# Patient Record
Sex: MALE | Race: WHITE | NOT HISPANIC OR LATINO | Employment: OTHER | URBAN - METROPOLITAN AREA
[De-identification: names, ages, dates, MRNs, and addresses within clinical notes are randomized per-mention and may not be internally consistent; named-entity substitution may affect disease eponyms.]

---

## 2017-04-19 PROBLEM — I10 ESSENTIAL HYPERTENSION: Status: ACTIVE | Noted: 2017-04-19

## 2017-06-13 PROBLEM — H35.81 MACULAR EDEMA: Status: ACTIVE | Noted: 2017-06-13

## 2017-12-13 PROBLEM — N52.9 ERECTILE DYSFUNCTION: Status: ACTIVE | Noted: 2017-12-13

## 2017-12-13 PROBLEM — K21.9 GASTROESOPHAGEAL REFLUX DISEASE: Status: ACTIVE | Noted: 2017-12-13

## 2017-12-13 PROBLEM — M17.0 OSTEOARTHRITIS OF BOTH KNEES: Status: ACTIVE | Noted: 2017-12-13

## 2018-03-27 PROBLEM — E66.9 OBESITY (BMI 30-39.9): Status: ACTIVE | Noted: 2018-03-27

## 2019-04-14 ENCOUNTER — OFFICE VISIT (OUTPATIENT)
Dept: URGENT CARE | Facility: CLINIC | Age: 69
End: 2019-04-14
Payer: MEDICARE

## 2019-04-14 DIAGNOSIS — L03.011 PARONYCHIA OF FINGER, RIGHT: Primary | ICD-10-CM

## 2019-04-14 PROCEDURE — 99214 PR OFFICE/OUTPT VISIT, EST, LEVL IV, 30-39 MIN: ICD-10-PCS | Mod: 25,S$GLB,, | Performed by: PHYSICIAN ASSISTANT

## 2019-04-14 PROCEDURE — 10060 I&D ABSCESS SIMPLE/SINGLE: CPT | Mod: S$GLB,,, | Performed by: PHYSICIAN ASSISTANT

## 2019-04-14 PROCEDURE — 99214 OFFICE O/P EST MOD 30 MIN: CPT | Mod: 25,S$GLB,, | Performed by: PHYSICIAN ASSISTANT

## 2019-04-14 PROCEDURE — 10060 PR DRAIN SKIN ABSCESS SIMPLE: ICD-10-PCS | Mod: S$GLB,,, | Performed by: PHYSICIAN ASSISTANT

## 2019-04-15 RX ORDER — SULFAMETHOXAZOLE AND TRIMETHOPRIM 800; 160 MG/1; MG/1
1 TABLET ORAL 2 TIMES DAILY
Qty: 14 TABLET | Refills: 0
Start: 2019-04-15 | End: 2019-04-22

## 2019-04-15 NOTE — PROGRESS NOTES
"Subjective:       Patient ID: Franco Russo is a 69 y.o. male.    Vitals:  height is 5' 9" (1.753 m) and weight is 100.3 kg (221 lb 1.9 oz). His oral temperature is 97.2 °F (36.2 °C). His blood pressure is 142/86 (abnormal) and his pulse is 68. His respiration is 18 and oxygen saturation is 97%.     Chief Complaint: Finger Pain    Patient complains of (R) thumb pain/swelling since last night. He states that when he woke up, it was very swollen and hot to the touch, possibly infected. He denies bumping into anything/injuring it. He is unsure what might have caused it.  He has not taken any medications for it, but states that running it under cool water makes it feel better. He states that there might be a splinter in it, but he is unsure.    Finger Pain   This is a new problem. The current episode started yesterday. The problem occurs constantly. The problem has been rapidly worsening. Pertinent negatives include no arthralgias, chest pain, chills, congestion, coughing, fatigue, fever, headaches, joint swelling, myalgias, nausea, rash, sore throat, vertigo or vomiting. Nothing aggravates the symptoms. He has tried nothing for the symptoms.       Constitution: Negative for chills, fatigue and fever.   HENT: Negative for congestion and sore throat.    Neck: Negative for painful lymph nodes.   Cardiovascular: Negative for chest pain and leg swelling.   Eyes: Negative for double vision and blurred vision.   Respiratory: Negative for cough and shortness of breath.    Gastrointestinal: Negative for nausea, vomiting and diarrhea.   Genitourinary: Negative for dysuria, frequency and urgency.   Musculoskeletal: Negative for joint pain, joint swelling, muscle cramps and muscle ache.   Skin: Positive for color change. Negative for pale, rash and erythema.   Allergic/Immunologic: Negative for seasonal allergies.   Neurological: Negative for dizziness, history of vertigo, light-headedness, passing out and headaches. "   Hematologic/Lymphatic: Negative for swollen lymph nodes, easy bruising/bleeding and history of blood clots. Does not bruise/bleed easily.   Psychiatric/Behavioral: Negative for nervous/anxious, sleep disturbance and depression. The patient is not nervous/anxious.        Objective:      Physical Exam   Constitutional: He is oriented to person, place, and time. He appears well-developed and well-nourished.   HENT:   Head: Normocephalic and atraumatic. Head is without abrasion, without contusion and without laceration.   Right Ear: External ear normal.   Left Ear: External ear normal.   Nose: Nose normal.   Mouth/Throat: Oropharynx is clear and moist.   Eyes: Pupils are equal, round, and reactive to light. Conjunctivae, EOM and lids are normal.   Neck: Trachea normal, full passive range of motion without pain and phonation normal. Neck supple.   Cardiovascular: Normal rate, regular rhythm and normal heart sounds.   Pulmonary/Chest: Effort normal and breath sounds normal. No stridor. No respiratory distress.   Musculoskeletal: Normal range of motion.   Neurological: He is alert and oriented to person, place, and time.   Skin: Skin is warm, dry and intact. Capillary refill takes less than 2 seconds. No abrasion, no bruising, no burn, no ecchymosis, no laceration, no lesion and no rash noted. No erythema.   Paronychia right thumb, surrounding erythema.  No tenderness to palpation of the joint.   Psychiatric: He has a normal mood and affect. His speech is normal and behavior is normal. Judgment and thought content normal. Cognition and memory are normal.   Nursing note and vitals reviewed.      Assessment:       1. Paronychia of finger, right        Plan:         Paronychia of finger, right    Other orders  -     sulfamethoxazole-trimethoprim 800-160mg (BACTRIM DS) 800-160 mg Tab; Take 1 tablet by mouth 2 (two) times daily. for 7 days  Dispense: 14 tablet; Refill: 0    There is an paronychia to the right thumb nail bed.     No anesthesia use.  Opened with 18 gauge needle.  Purulent material expressed.  Dressing applied.  Patient tolerated the procedure well.      Instructed probiotic with antibiotic.  Instructed warm soaks tonight.  If any increasing erythema or pain, return to clinic or go to ED.  Patient states understanding and agrees with the plan.    You must understand that you've received an Urgent Care treatment only and that you may be released before all your medical problems are known or treated. You, the patient, will arrange for follow up care as instructed.  Follow up with your PCP or specialty clinic as directed in the next 1-2 weeks if not improved or as needed.  You can call [phone] to schedule an appointment with the appropriate provider.  If your condition worsens we recommend that you receive another evaluation at the emergency room immediately or contact your primary medical clinics after hours call service to discuss your concerns.  Please return here or go to the Emergency Department for any concerns or worsening of condition.

## 2019-04-16 VITALS
DIASTOLIC BLOOD PRESSURE: 86 MMHG | WEIGHT: 221.13 LBS | TEMPERATURE: 97 F | BODY MASS INDEX: 32.75 KG/M2 | HEART RATE: 68 BPM | RESPIRATION RATE: 18 BRPM | OXYGEN SATURATION: 97 % | HEIGHT: 69 IN | SYSTOLIC BLOOD PRESSURE: 142 MMHG

## 2019-04-17 ENCOUNTER — TELEPHONE (OUTPATIENT)
Dept: URGENT CARE | Facility: CLINIC | Age: 69
End: 2019-04-17

## 2020-02-24 PROBLEM — I48.0 AF (PAROXYSMAL ATRIAL FIBRILLATION): Status: ACTIVE | Noted: 2020-02-24

## 2020-10-02 ENCOUNTER — IMMUNIZATION (OUTPATIENT)
Dept: FAMILY MEDICINE | Facility: CLINIC | Age: 70
End: 2020-10-02
Payer: MEDICARE

## 2020-10-02 PROCEDURE — 90694 VACC AIIV4 NO PRSRV 0.5ML IM: CPT | Mod: PBBFAC,PO

## 2020-10-02 PROCEDURE — G0008 ADMIN INFLUENZA VIRUS VAC: HCPCS | Mod: PBBFAC,PO

## 2020-12-21 ENCOUNTER — PATIENT MESSAGE (OUTPATIENT)
Dept: OTHER | Facility: OTHER | Age: 70
End: 2020-12-21

## 2022-09-30 PROBLEM — R00.1 BRADYCARDIA: Status: ACTIVE | Noted: 2022-09-30

## 2022-10-14 ENCOUNTER — IMMUNIZATION (OUTPATIENT)
Dept: FAMILY MEDICINE | Facility: CLINIC | Age: 72
End: 2022-10-14
Payer: MEDICARE

## 2022-10-14 PROCEDURE — G0008 ADMIN INFLUENZA VIRUS VAC: HCPCS | Mod: PBBFAC,PO

## 2023-06-12 PROBLEM — I70.0 AORTIC ATHEROSCLEROSIS: Status: ACTIVE | Noted: 2023-06-12

## 2023-07-03 ENCOUNTER — TELEPHONE (OUTPATIENT)
Dept: FAMILY MEDICINE | Facility: CLINIC | Age: 73
End: 2023-07-03
Payer: MEDICARE

## 2023-07-20 ENCOUNTER — OFFICE VISIT (OUTPATIENT)
Dept: PAIN MEDICINE | Facility: CLINIC | Age: 73
End: 2023-07-20
Payer: MEDICARE

## 2023-07-20 VITALS
DIASTOLIC BLOOD PRESSURE: 63 MMHG | BODY MASS INDEX: 33.06 KG/M2 | HEIGHT: 69 IN | WEIGHT: 223.19 LBS | HEART RATE: 75 BPM | SYSTOLIC BLOOD PRESSURE: 106 MMHG

## 2023-07-20 DIAGNOSIS — M54.50 LOW BACK PAIN, UNSPECIFIED BACK PAIN LATERALITY, UNSPECIFIED CHRONICITY, UNSPECIFIED WHETHER SCIATICA PRESENT: ICD-10-CM

## 2023-07-20 DIAGNOSIS — M54.50 LUMBAR BACK PAIN: Primary | ICD-10-CM

## 2023-07-20 DIAGNOSIS — G89.29 CHRONIC MIDLINE THORACIC BACK PAIN: ICD-10-CM

## 2023-07-20 DIAGNOSIS — M54.6 CHRONIC MIDLINE THORACIC BACK PAIN: ICD-10-CM

## 2023-07-20 PROCEDURE — 99999 PR PBB SHADOW E&M-EST. PATIENT-LVL IV: CPT | Mod: PBBFAC,,, | Performed by: PHYSICIAN ASSISTANT

## 2023-07-20 PROCEDURE — 99999 PR PBB SHADOW E&M-EST. PATIENT-LVL IV: ICD-10-PCS | Mod: PBBFAC,,, | Performed by: PHYSICIAN ASSISTANT

## 2023-07-20 PROCEDURE — 99214 OFFICE O/P EST MOD 30 MIN: CPT | Mod: PBBFAC,PN | Performed by: PHYSICIAN ASSISTANT

## 2023-07-20 PROCEDURE — 99204 PR OFFICE/OUTPT VISIT, NEW, LEVL IV, 45-59 MIN: ICD-10-PCS | Mod: S$PBB,,, | Performed by: PHYSICIAN ASSISTANT

## 2023-07-20 PROCEDURE — 99204 OFFICE O/P NEW MOD 45 MIN: CPT | Mod: S$PBB,,, | Performed by: PHYSICIAN ASSISTANT

## 2023-07-20 RX ORDER — METHOCARBAMOL 500 MG/1
500 TABLET, FILM COATED ORAL 4 TIMES DAILY
Qty: 40 TABLET | Refills: 0 | Status: SHIPPED | OUTPATIENT
Start: 2023-07-20 | End: 2023-07-20

## 2023-07-20 RX ORDER — METHOCARBAMOL 500 MG/1
500 TABLET, FILM COATED ORAL NIGHTLY PRN
Qty: 40 TABLET | Refills: 0 | Status: SHIPPED | OUTPATIENT
Start: 2023-07-20
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
## 2024-02-20 PROBLEM — R93.1 ELEVATED CORONARY ARTERY CALCIUM SCORE: Status: ACTIVE | Noted: 2024-02-20

## 2024-02-20 PROBLEM — I48.20 CHRONIC ATRIAL FIBRILLATION: Status: RESOLVED | Noted: 2024-02-20 | Resolved: 2024-02-20

## 2024-02-20 PROBLEM — I48.20 CHRONIC ATRIAL FIBRILLATION: Status: ACTIVE | Noted: 2024-02-20

## 2024-07-10 ENCOUNTER — OFFICE VISIT (OUTPATIENT)
Dept: PAIN MEDICINE | Facility: CLINIC | Age: 74
End: 2024-07-10
Payer: MEDICARE

## 2024-07-10 VITALS
SYSTOLIC BLOOD PRESSURE: 125 MMHG | HEIGHT: 69 IN | WEIGHT: 212.31 LBS | DIASTOLIC BLOOD PRESSURE: 73 MMHG | BODY MASS INDEX: 31.44 KG/M2 | HEART RATE: 92 BPM

## 2024-07-10 DIAGNOSIS — M54.50 LOW BACK PAIN, UNSPECIFIED BACK PAIN LATERALITY, UNSPECIFIED CHRONICITY, UNSPECIFIED WHETHER SCIATICA PRESENT: ICD-10-CM

## 2024-07-10 DIAGNOSIS — M54.6 CHRONIC MIDLINE THORACIC BACK PAIN: ICD-10-CM

## 2024-07-10 DIAGNOSIS — M54.50 LUMBAR BACK PAIN: ICD-10-CM

## 2024-07-10 DIAGNOSIS — G89.29 CHRONIC MIDLINE THORACIC BACK PAIN: ICD-10-CM

## 2024-07-10 PROCEDURE — 99999 PR PBB SHADOW E&M-EST. PATIENT-LVL IV: CPT | Mod: PBBFAC,,, | Performed by: PHYSICIAN ASSISTANT

## 2024-07-10 PROCEDURE — 99214 OFFICE O/P EST MOD 30 MIN: CPT | Mod: PBBFAC,PO | Performed by: PHYSICIAN ASSISTANT

## 2024-07-10 PROCEDURE — 99213 OFFICE O/P EST LOW 20 MIN: CPT | Mod: S$PBB,,, | Performed by: PHYSICIAN ASSISTANT

## 2024-07-10 RX ORDER — METHOCARBAMOL 500 MG/1
500 TABLET, FILM COATED ORAL NIGHTLY PRN
Qty: 40 TABLET | Refills: 0 | Status: SHIPPED | OUTPATIENT
Start: 2024-07-10
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
## 2024-08-31 PROBLEM — R55 POSTURAL DIZZINESS WITH PRESYNCOPE: Status: ACTIVE | Noted: 2024-08-31

## 2024-08-31 PROBLEM — R42 POSTURAL DIZZINESS WITH PRESYNCOPE: Status: ACTIVE | Noted: 2024-08-31

## 2024-08-31 PROBLEM — R07.9 CHEST PAIN: Status: ACTIVE | Noted: 2024-08-31

## 2024-09-01 PROBLEM — R07.89 ATYPICAL CHEST PAIN: Status: ACTIVE | Noted: 2024-08-31

## 2024-09-19 ENCOUNTER — PATIENT MESSAGE (OUTPATIENT)
Dept: PSYCHIATRY | Facility: CLINIC | Age: 74
End: 2024-09-19
Payer: MEDICARE

## 2024-09-26 ENCOUNTER — PATIENT MESSAGE (OUTPATIENT)
Dept: PSYCHIATRY | Facility: CLINIC | Age: 74
End: 2024-09-26
Payer: MEDICARE

## 2024-10-10 ENCOUNTER — OFFICE VISIT (OUTPATIENT)
Dept: PSYCHIATRY | Facility: CLINIC | Age: 74
End: 2024-10-10
Payer: MEDICARE

## 2024-10-10 VITALS
HEIGHT: 71 IN | BODY MASS INDEX: 26.62 KG/M2 | WEIGHT: 190.13 LBS | DIASTOLIC BLOOD PRESSURE: 74 MMHG | HEART RATE: 68 BPM | SYSTOLIC BLOOD PRESSURE: 113 MMHG

## 2024-10-10 DIAGNOSIS — F43.21 SITUATIONAL DEPRESSION: ICD-10-CM

## 2024-10-10 DIAGNOSIS — G47.00 INSOMNIA, UNSPECIFIED TYPE: ICD-10-CM

## 2024-10-10 DIAGNOSIS — F41.1 GAD (GENERALIZED ANXIETY DISORDER): Primary | ICD-10-CM

## 2024-10-10 PROCEDURE — 90792 PSYCH DIAG EVAL W/MED SRVCS: CPT | Mod: ,,,

## 2024-10-10 PROCEDURE — 99215 OFFICE O/P EST HI 40 MIN: CPT | Mod: PBBFAC,PO

## 2024-10-10 PROCEDURE — 99999 PR PBB SHADOW E&M-EST. PATIENT-LVL V: CPT | Mod: PBBFAC,,,

## 2024-10-10 RX ORDER — SERTRALINE HYDROCHLORIDE 50 MG/1
50 TABLET, FILM COATED ORAL DAILY
Qty: 30 TABLET | Refills: 1 | Status: SHIPPED | OUTPATIENT
Start: 2024-10-10 | End: 2024-10-16 | Stop reason: SINTOL
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
## 2024-10-15 ENCOUNTER — TELEPHONE (OUTPATIENT)
Dept: NEUROLOGY | Facility: CLINIC | Age: 74
End: 2024-10-15
Payer: MEDICARE

## 2024-10-15 NOTE — TELEPHONE ENCOUNTER
----- Message from Penny sent at 10/15/2024  3:18 PM CDT -----  Regarding: Same Day Appointment Request  Contact: Zee, daughter at [phone]  Type:  Same Day Appointment Request    Name of Caller:  Zee, daughter at [phone]    When is the first available appointment?  None    Symptoms:  right side numb, 1 day hospital follow up ([facility])    Additional Information:   Please call and advise. Thank you

## 2024-10-18 ENCOUNTER — OFFICE VISIT (OUTPATIENT)
Dept: PSYCHIATRY | Facility: CLINIC | Age: 74
End: 2024-10-18
Payer: MEDICARE

## 2024-10-18 DIAGNOSIS — F32.0 CURRENT MILD EPISODE OF MAJOR DEPRESSIVE DISORDER, UNSPECIFIED WHETHER RECURRENT: ICD-10-CM

## 2024-10-18 DIAGNOSIS — F41.1 GAD (GENERALIZED ANXIETY DISORDER): Primary | ICD-10-CM

## 2024-10-18 DIAGNOSIS — R45.89 ANXIETY ABOUT HEALTH: ICD-10-CM

## 2024-10-18 PROCEDURE — 99213 OFFICE O/P EST LOW 20 MIN: CPT | Mod: PBBFAC,PO

## 2024-10-18 PROCEDURE — 99999 PR PBB SHADOW E&M-EST. PATIENT-LVL III: CPT | Mod: PBBFAC,,,

## 2024-10-18 RX ORDER — ALPRAZOLAM 0.25 MG/1
0.25 TABLET ORAL DAILY PRN
Qty: 15 TABLET | Refills: 0 | Status: SHIPPED | OUTPATIENT
Start: 2024-10-18 | End: 2024-11-17

## 2024-10-18 RX ORDER — MIRTAZAPINE 15 MG/1
15 TABLET, FILM COATED ORAL NIGHTLY
Qty: 30 TABLET | Refills: 0 | Status: SHIPPED | OUTPATIENT
Start: 2024-10-18 | End: 2024-11-17
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
## 2024-10-21 ENCOUNTER — PATIENT MESSAGE (OUTPATIENT)
Dept: PSYCHIATRY | Facility: CLINIC | Age: 74
End: 2024-10-21
Payer: MEDICARE

## 2024-10-24 ENCOUNTER — OFFICE VISIT (OUTPATIENT)
Dept: NEUROLOGY | Facility: CLINIC | Age: 74
End: 2024-10-24
Payer: MEDICARE

## 2024-10-24 VITALS
BODY MASS INDEX: 26.37 KG/M2 | DIASTOLIC BLOOD PRESSURE: 71 MMHG | HEIGHT: 70 IN | HEART RATE: 55 BPM | WEIGHT: 184.19 LBS | SYSTOLIC BLOOD PRESSURE: 100 MMHG | RESPIRATION RATE: 15 BRPM

## 2024-10-24 DIAGNOSIS — R20.0 RIGHT SIDED NUMBNESS: ICD-10-CM

## 2024-10-24 DIAGNOSIS — G56.21 ENTRAPMENT OF RIGHT ULNAR NERVE: Primary | ICD-10-CM

## 2024-10-24 DIAGNOSIS — R20.0 RIGHT LEG NUMBNESS: ICD-10-CM

## 2024-10-24 PROCEDURE — 99999 PR PBB SHADOW E&M-EST. PATIENT-LVL V: CPT | Mod: PBBFAC,,, | Performed by: NURSE PRACTITIONER

## 2024-10-24 PROCEDURE — 99215 OFFICE O/P EST HI 40 MIN: CPT | Mod: PBBFAC,PO | Performed by: NURSE PRACTITIONER

## 2024-10-24 PROCEDURE — 99204 OFFICE O/P NEW MOD 45 MIN: CPT | Mod: S$PBB,,, | Performed by: NURSE PRACTITIONER

## 2024-10-24 NOTE — ASSESSMENT & PLAN NOTE
Reports of right arm numbness when resting his elbow on an object   - very mild pain and numbness  Overall improved.   Neuro exam without sensory or strength deficit  EMG/NCS previously ordered but symptoms began to improve so testing was canceled  Referral placed to PT for eval and suggestion on splinting.   If symptoms worsen consider referral to specialist   Conservative measures discussed
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
## 2024-10-24 NOTE — PROGRESS NOTES
"NEUROLOGY  Outpatient Consultation Visit     [facility]  [address] [facility] [place] [address]  [phone] (office) / [phone] (fax)    Patient Name:  Franco Russo  :  1950  MR #:  [id number]  Acct #:  [id number]    Date of Neurology Consult: 10/24/2024  Name of Provider: KEENA Clark    Other Physicians:  Chato Felix MD (Primary Care Physician); Self, Aaareferral (Referring)      Chief Complaint: Numbness      History of Present Illness (HPI):  Franco Russo is a right handed 74 y.o. male with a PMHX of VIDA, Afib       Patient presents today for right side numbness.   He had a right total knee replacement in 2024 and recovered OK. He was not getting much ROM with the right knee and it was decided he needed another surgery. In August he did dhave another surgery to clear out scar tissue. He began having numbness to his right leg in late August and presented to the ED . He was also having chest discomfort at this time and cardiac workup was completed. He states the numbness to his right knee eventually resolved.   He presented to the ED ~ 9 days ago for evaluation for reoccurane numbness to the right leg and new numbness to the right arm.  NIH 0. Workup was unrevealing. As per EMR his Sertraline dose was increased to 50 mg on 10/10/2024 by psychiatry. He has since been taken off this medication.     Since his 1st surgery he was having numbness to his right arm. He did see a MD about it and a EMG/NCS study was ordered but he didn't have it done as symptoms began to improve. Currently he is not having numbness to his arm but notices it more so when his elbow/arm is resting on an object.     ED report 10/15/2024  "74-year-old male with past medical history of hypertension, hypertriglyceridemia, CAD, paroxysmal AFib (anticoagulated on Eliquis), anxiety, GERD presents emergency department for evaluation of paresthesia. Patient initially reported that " "while lying in bed around 135 he developed right-sided numbness along with weakness to both arm and leg. Patient denies any recent trauma/injury. Patient denies any fever, headache, blurred vision, difficulty speaking, chest pain, shortness of breath, abdominal pain, urinary symptoms."                Past Medical, Surgical, Family & Social History:   Past Medical History:   Diagnosis Date    Abnormal liver enzymes     Adenomatous colon polyp     Atrial fibrillation     Benign enlargement of prostate     Branch retinal vein occlusion     bilateral     Cataracts, bilateral     Decreased libido     Heel pain     History of chicken pox     Hypertriglyceridemia     Knee pain     VIDA (obstructive sleep apnea)     USES CPAP    Pinched nerve     pt has had pain in neck into shoulder, has been seen at [facility]     Polyuria     Psoriasis     Thyroid nodule      Past Surgical History:   Procedure Laterality Date    CATARACT EXTRACTION, BILATERAL      COLONOSCOPY  02/23/2017    Dr. Valencia    COLONOSCOPY N/A 06/20/2022    FINGER SURGERY      right pinky finger     HERNIA REPAIR      umbilical    JOINT REPLACEMENT Right 03/14/2024    ; [facility]    KNEE SURGERY Right      Family History   Problem Relation Name Age of Onset    Arthritis Mother      Hypertension Mother      Heart disease Mother      Cancer Father      Hypertension Sister      Hypertension Brother       Alcohol use:  reports that he does not currently use alcohol after a past usage of about 2.0 standard drinks of alcohol per week.   (Of note, 0.6 oz = 1 beer or 6 oz = 10 beers).  Tobacco use:  reports that he has never smoked. He has never used smokeless tobacco.  Street drug use:  reports no history of drug use.  Allergies: Prozac [fluoxetine] and Zoloft [sertraline].    Home Medications:     Current Outpatient Medications:     ALPRAZolam (XANAX) 0.25 MG tablet, Take 1 tablet (0.25 mg total) by mouth daily as needed for Anxiety., Disp: 15 " tablet, Rfl: 0    ELIQUIS 5 mg Tab, TAKE 1 TABLET BY MOUTH TWICE DAILY, Disp: 60 tablet, Rfl: 6    hydroCHLOROthiazide (HYDRODIURIL) 12.5 MG Tab, TAKE 1 TABLET BY MOUTH EVERY MORNING FOR FLUID RETENTION, Disp: 90 tablet, Rfl: 1    losartan (COZAAR) 100 MG tablet, TAKE 1 TABLET BY MOUTH ONCE EVERY DAY FOR BLOOD PRESSURE, Disp: 90 tablet, Rfl: 3    MULTIVIT &MINERALS/FERROUS FUM (MULTI VITAMIN ORAL), Take 1 tablet by mouth once daily., Disp: , Rfl:     omega-3 fatty acids/fish oil (FISH OIL-OMEGA-3 FATTY ACIDS) 300-1,000 mg capsule, Take 1 capsule by mouth once daily., Disp: , Rfl:     polyethylene glycol (GLYCOLAX) 17 gram/dose powder, Take 17 g by mouth once daily., Disp: , Rfl:     rosuvastatin (CRESTOR) 5 MG tablet, Take 1 tablet (5 mg total) by mouth every evening., Disp: 90 tablet, Rfl: 3    sotaloL (BETAPACE) 80 MG tablet, Take 0.5 tablets (40 mg total) by mouth 2 (two) times daily., Disp: 30 tablet, Rfl: 1    tamsulosin (FLOMAX) 0.4 mg Cap, Take 1 capsule (0.4 mg total) by mouth nightly. TAKE 1 CAPSULE BY MOUTH ONCE EVERY DAY (PROSTATE), Disp: 90 capsule, Rfl: 3    VIT C/VIT E/LUTEIN/MIN/OMEGA-3 (OCUVITE ORAL), Take 1 capsule by mouth once daily., Disp: , Rfl:     finasteride (PROSCAR) 5 mg tablet, TAKE 1 TABLET BY MOUTH ONCE EVERY DAY FOR PROSTATE (Patient not taking: Reported on 10/24/2024), Disp: 90 tablet, Rfl: 1    HYDROcodone-acetaminophen (NORCO)  mg per tablet, Take 1 tablet by mouth 4 (four) times daily as needed for Pain. (Patient not taking: Reported on 10/24/2024), Disp: , Rfl:     levocetirizine (XYZAL) 5 MG tablet, Take 5 mg by mouth every evening. (Patient not taking: Reported on 10/24/2024), Disp: , Rfl:     mirtazapine (REMERON) 15 MG tablet, Take 1 tablet (15 mg total) by mouth every evening. (Patient not taking: Reported on 10/24/2024), Disp: 30 tablet, Rfl: 0    omeprazole-sodium bicarbonate 20-1.1 mg-gram Cap, Take 1 tablet by mouth once daily. (Patient not taking: Reported on  "10/24/2024), Disp: 30 each, Rfl: 11    sildenafiL (VIAGRA) 50 MG tablet, TAKE 1 TABLET BY MOUTH DAILY AS NEEDED FOR ERECTILE DYSFUNCTION (TAKE 30 MINUTES TO 4 HOURS BEFORE INTERCOURSE) (Patient not taking: Reported on 10/24/2024), Disp: 2 tablet, Rfl: 11    Physical Examination:  /71 (BP Location: Left arm, Patient Position: Sitting)   Pulse (!) 55   Resp 15   Ht 5' 10" (1.778 m)   Wt 83.6 kg (184 lb 3.1 oz)   BMI 26.43 kg/m²     GENERAL:  General appearance: Well, non-toxic appearing.  No apparent distress.  Neck: supple.    MENTAL STATUS:  Alertness, attention span & concentration: normal.  Language: normal.  Orientation to self, place & time:  normal.  Memory, recent & remote: normal.  Fund of knowledge: normal.      SPEECH:  Clear and fluent.  Follows complex commands.      CRANIAL NERVES:  Cranial Nerves II-XII were examined.  II - Visual fields: normal.  III, IV, VI: PERRL, EOMI, No ptosis, No nystagmus.  V - Facial sensation: normal.  VII - Face symmetry & mobility: normal.  VIII - Hearing: normal  IX, X - Palate: mobile & midline.  XI - Shoulder shrug: normal.  XII - Tongue protrusion: normal.        GROSS MOTOR:  Gait & station: antalgic   Tone: normal.  Abnormal movements: none.  Finger-nose: normal.  Rapid alternating movements: normal.  Pronator drift: normal      MUSCLE STRENGTH:       RIGHT    LEFT   5 Deltoids 5   5 Biceps 5   5 Triceps 5   5 Forearm.Pr. 5        5 Iliopsoas flex    5   5 Hip Abduct 5   5 Hip Adduct 5   5 Quads 5   5 Hams 5   5 Dorsiflex 5   5 Plantar Flex 5         REFLEXES:    RIGHT Reflex   LEFT   2+ Biceps 2+   2+ Brachiorad. 2+        - Patellar 2+         SENSORY:  Light touch: Normal throughout.   Sharp touch: Normal throughout.  Vibration: Normal throughout.   Temperature: Normal throughout.   Joint Position: Normal throughout.      Diagnostic Data Reviewed:   I have personally reviewed provider notes, labs and imaging made available to me today.     Imaging:    CT " Head Without Contrast 10/15/2024    Narrative  EXAMINATION:  CT HEAD WITHOUT CONTRAST    CLINICAL HISTORY:  Transient ischemic attack (TIA).    TECHNIQUE:  Low dose axial images were obtained through the head.  Coronal and sagittal reformations were also performed. Contrast was not administered.  Dose reduction techniques including automatic exposure control (AEC) were utilized.    Dose (DLP): 538 mGycm    COMPARISON:  CT head without contrast, 08/31/2024.    FINDINGS:  INTRACRANIAL: Stable global volume loss.  Gray-white differentiation is preserved.  No acute intracranial hemorrhage.  No hydrocephalus.  No intracranial mass effect.    SINUSES: Visualized paranasal sinuses and mastoids are clear.    SKULL/SCALP: Visualized osseous structures are normal.    ORBITS: Visualized orbits are normal.    Impression  Nighthawk concordant.  No acute intracranial abnormality.      Cardiac:  US LE 10/2024:  Impression:     Nighthawk concordant.  No evidence of deep venous thrombosis in the right lower extremity.    TTE 9/2024:    Left Ventricle: The left ventricle is normal in size. Normal wall thickness. There is normal systolic function. Ejection fraction by visual approximation is 55%. There is diastolic dysfunction but grade cannot be determined.    Right Ventricle: Normal right ventricular cavity size. Wall thickness is normal. Systolic function is normal.    Left Atrium: Left atrium is dilated. The left atrium volume index MOD is 44.4 mL/m2.    Aortic Valve: The aortic valve is a trileaflet valve.    Pulmonary Artery: The estimated pulmonary artery systolic pressure is 18 mmHg.    IVC/SVC: Normal venous pressure at 3 mmHg.    Labs:  Lab Results   Component Value Date    WBC 5.45 10/15/2024    HGB 13.9 (L) 10/15/2024    HCT 40.4 10/15/2024     10/15/2024    MCV 88 10/15/2024    RDW 12.9 10/15/2024     Lab Results   Component Value Date     10/15/2024    K 3.7 10/15/2024     10/15/2024    CO2 26  "10/15/2024    BUN 15 10/15/2024    CREATININE 0.73 10/15/2024     10/15/2024    CALCIUM 9.6 10/15/2024    MG 2.0 10/15/2024     Lab Results   Component Value Date    PROT 7.2 10/15/2024    ALBUMIN 4.4 10/15/2024    BILITOT 1.4 (H) 10/15/2024    AST 22 10/15/2024    ALKPHOS 58 10/15/2024    ALT 19 10/15/2024     No results found for: "INR", "PROTIME", "PTT"  Lab Results   Component Value Date    CHOL 131 09/01/2024    HDL 34 (L) 09/01/2024    LDLCALC 63.4 09/01/2024    TRIG 168 (H) 09/01/2024    CHOLHDL 26.0 09/01/2024     Lab Results   Component Value Date    HGBA1C 5.7 (H) 08/31/2024      No results found for: "CGWRMRCR01"  No results found for: "FOLATE"  Lab Results   Component Value Date    TSH 1.650 04/24/2024     No results found for: "VITAMINB1"  No results found for: "RPR"  No results found for: "YEN"  No components found for: "HEPATITISCANTIBODY"  No components found for: "HIV 1/2 AG/AB"  Lab Results   Component Value Date    CRP <0.50 06/26/2024     No components found for: "SEDIMENTATIONRATE"      Assessment and Plan:  Franco Russo is a 74 y.o. male.    Problem List Items Addressed This Visit          Neuro    Right leg numbness    Current Assessment & Plan     Numbness from right knee down to ankle that began after his second TK revision in August.   Use of CPM exacerbated symptoms.   Presented to ED with worsening numbness as well as RUE numbness in September   - neuro w/u unrevealing; NIH 0  Currently asymptomatic   Etiology likely related to recent TKR           Entrapment of right ulnar nerve - Primary    Current Assessment & Plan     Reports of right arm numbness when resting his elbow on an object   - very mild pain and numbness  Overall improved.   Neuro exam without sensory or strength deficit  EMG/NCS previously ordered but symptoms began to improve so testing was canceled  Referral placed to PT for eval and suggestion on splinting.   If symptoms worsen consider referral to specialist "   Conservative measures discussed                     Important to note, also  has a past medical history of Abnormal liver enzymes, Adenomatous colon polyp, Atrial fibrillation, Benign enlargement of prostate, Branch retinal vein occlusion, Cataracts, bilateral, Decreased libido, Heel pain, History of chicken pox, Hypertriglyceridemia, Knee pain, VIDA (obstructive sleep apnea), Pinched nerve, Polyuria, Psoriasis, and Thyroid nodule.            The patient will return to clinic as needed         All questions were answered and patient is comfortable with the plan.       Thank you very much for the opportunity to assist in this patient's care.    If you have any questions or concerns, please do not hesitate to contact me at any time.    Sincerely,     KEENA Clark  [facility] [place]         I spent a total of 48 minutes on the day of the visit.This includes face to face time and non-face to face time preparing to see the patient (eg, review of tests), Obtaining and/or reviewing separately obtained history, Documenting clinical information in the electronic or other health record, Independently interpreting resultsand communicating results to the patient/family/caregiver, or Care coordination.

## 2024-11-01 ENCOUNTER — OFFICE VISIT (OUTPATIENT)
Dept: PSYCHIATRY | Facility: CLINIC | Age: 74
End: 2024-11-01
Payer: MEDICARE

## 2024-11-01 DIAGNOSIS — F32.0 CURRENT MILD EPISODE OF MAJOR DEPRESSIVE DISORDER, UNSPECIFIED WHETHER RECURRENT: ICD-10-CM

## 2024-11-01 DIAGNOSIS — F41.1 GAD (GENERALIZED ANXIETY DISORDER): Primary | ICD-10-CM

## 2024-11-01 DIAGNOSIS — G47.00 INSOMNIA, UNSPECIFIED TYPE: ICD-10-CM

## 2024-11-01 PROCEDURE — G2211 COMPLEX E/M VISIT ADD ON: HCPCS | Mod: S$PBB,,,

## 2024-11-01 PROCEDURE — 99212 OFFICE O/P EST SF 10 MIN: CPT | Mod: PBBFAC,PO

## 2024-11-01 PROCEDURE — 99214 OFFICE O/P EST MOD 30 MIN: CPT | Mod: S$PBB,,,

## 2024-11-01 PROCEDURE — 99999 PR PBB SHADOW E&M-EST. PATIENT-LVL II: CPT | Mod: PBBFAC,,,

## 2024-11-01 RX ORDER — MIRTAZAPINE 30 MG/1
30 TABLET, FILM COATED ORAL NIGHTLY
Qty: 30 TABLET | Refills: 0 | Status: SHIPPED | OUTPATIENT
Start: 2024-11-01 | End: 2024-12-01
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
## 2024-11-14 ENCOUNTER — OFFICE VISIT (OUTPATIENT)
Dept: PSYCHIATRY | Facility: CLINIC | Age: 74
End: 2024-11-14
Payer: MEDICARE

## 2024-11-14 DIAGNOSIS — G47.00 INSOMNIA, UNSPECIFIED TYPE: ICD-10-CM

## 2024-11-14 DIAGNOSIS — F32.0 CURRENT MILD EPISODE OF MAJOR DEPRESSIVE DISORDER, UNSPECIFIED WHETHER RECURRENT: ICD-10-CM

## 2024-11-14 DIAGNOSIS — F41.1 GAD (GENERALIZED ANXIETY DISORDER): Primary | ICD-10-CM

## 2024-11-14 PROCEDURE — 90833 PSYTX W PT W E/M 30 MIN: CPT | Mod: ,,,

## 2024-11-14 PROCEDURE — 99213 OFFICE O/P EST LOW 20 MIN: CPT | Mod: PBBFAC,PO

## 2024-11-14 PROCEDURE — 99999 PR PBB SHADOW E&M-EST. PATIENT-LVL III: CPT | Mod: PBBFAC,,,

## 2024-11-14 PROCEDURE — 99214 OFFICE O/P EST MOD 30 MIN: CPT | Mod: S$PBB,,,

## 2024-11-14 RX ORDER — DULOXETIN HYDROCHLORIDE 20 MG/1
CAPSULE, DELAYED RELEASE ORAL
Qty: 46 CAPSULE | Refills: 0 | Status: SHIPPED | OUTPATIENT
Start: 2024-11-14 | End: 2024-12-14

## 2024-11-14 RX ORDER — HYDROXYZINE HYDROCHLORIDE 25 MG/1
25 TABLET, FILM COATED ORAL 3 TIMES DAILY PRN
Qty: 90 TABLET | Refills: 0 | Status: SHIPPED | OUTPATIENT
Start: 2024-11-14 | End: 2024-12-14

## 2024-11-14 RX ORDER — MIRTAZAPINE 7.5 MG/1
7.5 TABLET, FILM COATED ORAL NIGHTLY
Qty: 7 TABLET | Refills: 0 | Status: SHIPPED | OUTPATIENT
Start: 2024-11-14 | End: 2024-11-21
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
## 2024-11-14 NOTE — PATIENT INSTRUCTIONS
Mirtazapine -  decrease from 30 mg to 15 mg at night for 7 days   Then decrease to 7.5 mg at night for 7 days  Then stop    Duloxetine (Cymbalta)  Take 20 mg daily (at night or in the morning, which ever is more convenient) for 14 days  Then increase to 20mg (2 capsules daily)    Hydroxyzine -   Take 1 tablet as needed for severe anxiety, do not take more than 3 tablets per day

## 2024-11-14 NOTE — PROGRESS NOTES
OUTPATIENT PSYCHIATRY FOLLOW UP VISIT    Encounter Date: 11/14/2024    Clinical Status of Patient:  Outpatient (Ambulatory)    Chief Complaint:  Franco Russo is a 74 y.o. male who presents today for follow-up.  Met with patient and daughter.      HISTORY OF PRESENTING ILLNESS:  Franco Russo is a 74 y.o. male with history of MDD and TYSHAWN who presents for follow up appointment.      Plan at last appointment on 10/10/2024:  Increase mirtazapine from 15-30 mg q.h.s. for mood and anxiety  Continue alprazolam 0.25 mg daily as a short term course until mirtazapine takes effect  Referral placed for individual psychotherapy.    Psychotropic medication history:   Prozac 10 mg daily (took for 10 days; increased anxiety)   Sertraline 50 mg (took for 3 days, increased anxiety)    INTERVAL HISTORY:    At last visit, mirtazapine was increased from 15 to 30 mg q.h.s.. Today Pt denies improvement in generalized anxiety, which continues.    He also denies improvement in insomnia. Typical bed time is 11 PM, wakes up 1-2 times per night with difficulty returning to sleep, wakes up at 7 AM.     Mood has been stable.     Is patient experiencing or having changes in:  Trouble with sleep:  difficulty initiating and maintaining sleep  Appetite changes:   decreased   Weight changes:  no change in the interim; 45 lb loss since March  Lack of energy: decreased since starting mirtazapine  Anhedonia:  decreased interest in hobbies   Somatic symptoms:  no  Anxiety/panic: continues  Irritability: no  Guilty/hopeless:  no  Concentration: no  Racing thoughts: no  Impulsive behaviors: no  Paranoia/AVH: no  Self-injurious behavior/risky behavior:  no  Any drugs:  no  Alcohol:  no    No interval episodes with symptoms consistent with so or hypomania.  Denied interval or current suicidal/homicidal thoughts, intent, or plan or NSSI.  Denied other questions and concerns.    Medication side effects: see above  Medication adherence:  yes    Psychotherapy:  Target symptoms: recurrent depression, anxiety   Why chosen therapy is appropriate versus another modality: evidence based practice  Outcome monitoring methods: self-report, observation  Therapeutic intervention type: supportive psychotherapy  Topics discussed/themes: building skills sets for symptom management, symptom recognition  The patient's response to the intervention is accepting. The patient's progress toward treatment goals is fair.   Duration of intervention: 16 minutes.    MEDICAL REVIEW OF SYSTEMS:   Pain: Chronic pain  Constitutional: Denies fever or change in appetite.  Cardiovascular: Denies chest pain or exertional dyspnea.  Respiratory: Denies cough or orthopnea.   GI: Denies abdominal pain, N/V  Neurological: Denies tremor, seizure, or focal weakness.  Psychiatric: See HPI above.    PAST PSYCHIATRIC, MEDICAL, AND SOCIAL HISTORY REVIEWED  The patient's past medical, family and social history have been reviewed and updated as appropriate within the electronic medical record - see encounter notes.    PAST MEDICAL HISTORY:   Past Medical History:   Diagnosis Date    Abnormal liver enzymes     Adenomatous colon polyp     Atrial fibrillation     Benign enlargement of prostate     Branch retinal vein occlusion     bilateral     Cataracts, bilateral     Decreased libido     Heel pain     History of chicken pox     Hypertriglyceridemia     Knee pain     VIDA (obstructive sleep apnea)     USES CPAP    Pinched nerve     pt has had pain in neck into shoulder, has been seen at [facility]     Polyuria     Psoriasis     Thyroid nodule     Head trauma/Loss of consciousness: denies  Seizures: denies     PAST PSYCHIATRIC HISTORY:  Psychiatric Care (current & past):  denies  Previous Psychiatric Diagnoses:  denies  Previous Psychiatric Hospitalizations: denies   Previous SI/HI:  denies  Previous Suicide Attempts or NSSI: denies  History of Psychotherapy: denies  History of  "Violence: denies    FAMILY HISTORY:   Paternal: no psychiatric history or history of substance abuse or suicide  Maternal: no psychiatric history or history of substance abuse or suicide  Siblings: younger brother poly substance abuse     SOCIAL HISTORY:   Developmental/Childhood: born in [facility] raised in [facility], describes childhood as "good";  had 5 siblings   History of Physical/Sexual Abuse: denies  Marital Status/Relationship Status:   Children: 3 girls   Resides/Housing Status: [place] with one daughter   Occupation/Employment: A/C contractor   Hobbies/Recreational Activities: hunting, spending time at his camp,    Spirituality/Anabaptist: Episcopal, not practicing   Education level: high school graduate, trade school    History: denies  Legal History: denies  Access to firearms: yes, Pt hunts     SUBSTANCE USE HISTORY:  Caffeine: no, decaf coffee   Tobacco: denies  Alcohol: denies  Other Substances: denies  Rehab: denies  Detoxes:  denies      EXAM:  Constitutional  Vitals:  Most recent vital signs were reviewed.   Last 3 sets of VS:      10/24/2024     9:00 AM 10/28/2024    10:23 AM 11/19/2024     3:25 PM   Vitals - 1 value per visit   SYSTOLIC 100 118 122   DIASTOLIC 71 64 84   Pulse 55 54 53   Temp   97.6 °F (36.4 °C)   Resp 15 15    SPO2   98 %   Weight (lb) 184.19 168 182.1   Weight (kg) 83.55 76.204 82.6   Height 5' 10" (1.778 m) 5' 10" (1.778 m) 5' 10" (1.778 m)   BMI (Calculated) 26.4 24.1 26.1   Pain Score Zero Zero Zero      General:  unremarkable, age appropriate     Musculoskeletal  Muscle Strength/Tone:  No tremor or abnormal movements   Gait & Station:  Steady, non-ataxic     Psychiatric  Speech:  no latency; no press   Mood & Affect:  anxious  congruent and appropriate   Thought Process:  normal and logical   Associations:  intact   Thought Content:  normal, no suicidality, no homicidality, delusions, or paranoia   Insight:  intact   Judgement: behavior is adequate to " circumstances   Orientation:  grossly intact   Memory: intact for content of interview   Language: grossly intact   Attention Span & Concentration:  intact to interview   Fund of Knowledge:  not formally tested     SUICIDE RISK ASSESSMENT:  Protective factors:  no prior attempts, no prior hospitalizations, no family h/o attempts, no ongoing substance abuse, no psychosis, has children, denies SI/intent/plan, seeking treatment, access to treatment, future oriented, good primary support  Risks:  Age, gender, Ongoing anxiety, access to firearms  Patient is a low immediate and long-term risk considering risk factors.     RELEVANT LABS/STUDIES:    Lab Results   Component Value Date    WBC 5.45 10/15/2024    HGB 13.9 (L) 10/15/2024    HCT 40.4 10/15/2024    MCV 88 10/15/2024     10/15/2024     BMP  Lab Results   Component Value Date     10/15/2024    K 3.7 10/15/2024     10/15/2024    CO2 26 10/15/2024    BUN 15 10/15/2024    CREATININE 0.73 10/15/2024    CALCIUM 9.6 10/15/2024    ANIONGAP 10 10/15/2024    ESTGFRAFRICA >60 06/10/2022    EGFRNONAA >60 06/10/2022     Lab Results   Component Value Date    ALT 19 10/15/2024    AST 22 10/15/2024    ALKPHOS 58 10/15/2024    BILITOT 1.4 (H) 10/15/2024     Lab Results   Component Value Date    TSH 1.650 04/24/2024     Lab Results   Component Value Date    HGBA1C 5.7 (H) 08/31/2024     Lab Results   Component Value Date    CHOL 131 09/01/2024    TRIG 168 (H) 09/01/2024    HDL 34 (L) 09/01/2024    LDLCALC 63.4 09/01/2024    CHOLHDL 26.0 09/01/2024    TOTALCHOLEST 3.9 09/01/2024       IMPRESSION:    Franco Russo is a 74 y.o. male with history of MDD and TYSHAWN who presents for follow up appointment.    Status/Progress: Based on the examination today, the patient's problem(s) is/are inadequately controlled.  New problems have not been presented today.   Co-morbidities are not complicating management of the primary condition.  There are no active rule-out diagnoses  for this patient at this time.     Risk Parameters:  Patient reports no suicidal ideation  Patient reports no homicidal ideation  Patient reports no self-injurious behavior  Patient reports no violent behavior    DIAGNOSES:    ICD-10-CM ICD-9-CM   1. TYSHAWN (generalized anxiety disorder)  F41.1 300.02   2. Current mild episode of major depressive disorder, unspecified whether recurrent  F32.0 296.21   3. Insomnia, unspecified type  G47.00 780.52       PLAN:  Decrease mirtazapine from 30-15 mg q.h.s. for 7 days, then decrease to 7.5 mg q.h.s. for 7 days, then discontinue  Start duloxetine 20 mg daily for 14 days, then increase to 40 mg daily for anxiety and mood.   Start hydroxyzine 25 mg t.i.d. p.r.n. anxiety   Continue alprazolam 0.25 mg daily p.r.n. anxiety as a short term course until duloxetine takes effect  Referral previously placed for individual psychotherapy. Initial appt has been scheduled with TESSIE Rivera LCSW for 11/29    RETURN TO CLINIC:   1 month      KURT Barber, PMHNP-BC      45 minutes of total time spent on the encounter, which includes face to face time and non-face to face time preparing to see the patient (eg. review of tests), obtaining and/or reviewing separately obtained history, documenting clinical information in the electronic health record, independently interpreting results (not separately reported), and communicating results to the patient/family/caregiver, or care coordination (not separately reported).     Visit today included managing the longitudinal care of the patient due to the serious and/or complex managed problem(s) MDD and TYSHAWN.    At this time there are no indications the patient represents an imminent danger to either themselves or others; will continue to manage treatment in the outpatient setting.    I discussed the patient's care with the patient including benefits, alternatives, possible adverse effects of the treatment plan; including the potential for metabolic  "complications, major organ dysfunction, black box warnings, and contraindications. The opportunity was given for questions/clarification, and after this discussion the above treatment plan was devised through shared decision making. The patient voiced their understanding of the diagnoses and treatments listed above and agreed to the treatment plan. Follow up plan was reviewed with the patient. The patient was advised to call to report any worsening of symptoms or problems with medication.    Supportive therapy and psychoeducation provided. I discussed the importance of regular exercise, maintenance of a healthy weight, balanced diet rich in fruits/vegetables and lean protein, and avoidance of unhealthy habits like smoking and excessive alcohol intake.     Patient has been given crisis information including Suicide and Crisis Lifeline (call or text: 776). Patient also given instructions to go to the nearest ER or call 911 if unable to remain safe or if the Pt develops thoughts of harming self or others.    [facility]: Reviewed today to detect potential controlled substance misuse, diversion, excessive prescribing, or multiple providers prescribing controlled substances. The patients report was deemed appropriate without new medications of concern prescribed by other providers.    Documentation entered by me for this encounter may have been done in part using [employer] voice recognition transcription software. Garbled syntax, mangled pronouns, and other bizarre constructions may be attributed to that software system. Although I have made an effort to ensure accuracy, "sound like" errors may exist and should be interpreted in context.      "

## 2024-11-15 ENCOUNTER — TELEPHONE (OUTPATIENT)
Dept: NEUROLOGY | Facility: CLINIC | Age: 74
End: 2024-11-15
Payer: MEDICARE

## 2024-11-19 PROBLEM — E66.9 OBESITY (BMI 30-39.9): Status: RESOLVED | Noted: 2018-03-27 | Resolved: 2024-11-19

## 2024-11-21 ENCOUNTER — TELEPHONE (OUTPATIENT)
Dept: NEUROSURGERY | Facility: CLINIC | Age: 74
End: 2024-11-21
Payer: MEDICARE

## 2024-11-29 ENCOUNTER — OFFICE VISIT (OUTPATIENT)
Dept: PSYCHIATRY | Facility: CLINIC | Age: 74
End: 2024-11-29
Payer: MEDICARE

## 2024-11-29 DIAGNOSIS — F41.1 GAD (GENERALIZED ANXIETY DISORDER): ICD-10-CM

## 2024-11-29 DIAGNOSIS — F33.1 MODERATE EPISODE OF RECURRENT MAJOR DEPRESSIVE DISORDER: Primary | ICD-10-CM

## 2024-11-29 NOTE — PROGRESS NOTES
Date: 11/29/2024    Site: [facility]    Referral source: Zee Rowell NP    Clinical status of patient: Outpatient    Franco Russo, a 74 y.o. male, for initial evaluation visit.  Met with patient and daughter, Fabiana. When asked the reason he is seeking psychotherapy, Patient reported, he has never had therapy before and is wants therapy to help him with anxiety and depression symptoms.    Chief complaint/reason for encounter: depression and anxiety    History of present illness: Reviewed chart.     Pain: chronic knee pain no pain today.    Symptoms:   Mood: depressed mood, diminished interest, weight loss, insomnia, hypersomnia, fatigue, worthlessness/guilt, poor concentration, altered libido, thoughts of death, hypomania, and social isolation  Anxiety: excessive anxiety/worry, restlessness/keyed up, muscle tension, and panic attacks  Substance abuse: no recent alcohol use and no recreational drug use in his past when younger.  Cognitive functioning:  Rumination and intrusive thoughts  Health behaviors:  nail biting        How often to you feel depressed? Patient reports having some depression symptoms when family goes to work. Today: 4/10  How often do you feel anxious? Patient reports having had anxiety symptoms daily for the last 30 days. Today: 3.5/10  How's your sleeping? Patient reports sleeping at least 6 hours nightly sometimes more.  Some mornings feels rested upon waking.      Psychiatric history: currently under psychiatric care   Hx of counseling denied  Hx of psych inpatient denied  Psych Dx MDD, TYSHAWN  Hx of violent behavior denied  Current SI? Denied  Ever attempted suicide? Last time and how  Self-Harm? Cutting/Burning? denied  Seeing things, hearing things no one else does? denied  Homicidal Ideation? Denied    [place] Suicide Severity Rating Scale  1. Have you wished you were dead or wished you could go to sleep and not wake up?   __X____ Yes  ______ No  2.  Have you actually had any  thoughts of killing yourself?   ______ Yes  ___X___ No  (If yes to 2, ask questions 3,4,5 and 6.  If No to 2, go directly to 6)  3. Have you been thinking about how you might do this?   ______ Yes  ______ No  4. Have you had these thoughts and had some intention of acting on them?   ______ Yes  ______ No  5.  Have you started to work out or worked out the details of how to kill yourself?  Do you intend to carry out this plan?   ______ Yes  ______ No  6. Have you ever done anything, started to do anything, or prepared to do anything to end your life?   ______ Yes  ___X___ No  If yes :  Were any of these in the past 3 months?   ______ Yes  ______ No    Patient owns guns kept at his son-in-law's house.    Medical history: Reviewed. See Below:    Patient Active Problem List   Diagnosis    Enlarged prostate without lower urinary tract symptoms (luts)    Hypertriglyceridemia    Obstructive apnea    Essential hypertension    Macular edema    Osteoarthritis of both knees    Erectile dysfunction    Gastroesophageal reflux disease    At risk for coronary artery disease    PAF (paroxysmal atrial fibrillation)    Bradycardia    Aortic atherosclerosis    Elevated coronary artery calcium score    Atypical chest pain    Postural dizziness with presyncope    Right leg numbness    Entrapment of right ulnar nerve     Past Medical History:   Diagnosis Date    Abnormal liver enzymes     Adenomatous colon polyp     Atrial fibrillation     Benign enlargement of prostate     Branch retinal vein occlusion     bilateral     Cataracts, bilateral     Decreased libido     Heel pain     History of chicken pox     Hypertriglyceridemia     Knee pain     VIDA (obstructive sleep apnea)     USES CPAP    Pinched nerve     pt has had pain in neck into shoulder, has been seen at [facility]     Polyuria     Psoriasis     Thyroid nodule      Past Surgical History:   Procedure Laterality Date    CATARACT EXTRACTION, BILATERAL      COLONOSCOPY   02/23/2017    Dr. Valencia    COLONOSCOPY N/A 06/20/2022    FINGER SURGERY      right pinky finger     HERNIA REPAIR      umbilical    JOINT REPLACEMENT Right 03/14/2024    ; [facility]    KNEE SURGERY Right      Current Outpatient Medications on File Prior to Visit   Medication Sig Dispense Refill    ALPRAZolam (XANAX) 0.25 MG tablet Take 1 tablet (0.25 mg total) by mouth daily as needed for Anxiety. (Patient not taking: Reported on 11/19/2024) 15 tablet 0    DULoxetine (CYMBALTA) 20 MG capsule Take 1 capsule (20 mg total) by mouth once daily for 14 days, THEN 2 capsules (40 mg total) once daily for 16 days. 46 capsule 0    ELIQUIS 5 mg Tab TAKE 1 TABLET BY MOUTH TWICE DAILY 60 tablet 6    finasteride (PROSCAR) 5 mg tablet TAKE 1 TABLET BY MOUTH ONCE EVERY DAY FOR PROSTATE 90 tablet 1    hydroCHLOROthiazide (HYDRODIURIL) 12.5 MG Tab TAKE 1 TABLET BY MOUTH EVERY MORNING FOR FLUID RETENTION 90 tablet 1    hydrOXYzine HCL (ATARAX) 25 MG tablet Take 1 tablet (25 mg total) by mouth 3 (three) times daily as needed for Anxiety. 90 tablet 0    levocetirizine (XYZAL) 5 MG tablet Take 5 mg by mouth every evening.      losartan (COZAAR) 100 MG tablet TAKE 1 TABLET BY MOUTH ONCE EVERY DAY FOR BLOOD PRESSURE 90 tablet 3    mirtazapine (REMERON) 7.5 MG Tab Take 1 tablet (7.5 mg total) by mouth every evening. for 7 days 7 tablet 0    MULTIVIT &MINERALS/FERROUS FUM (MULTI VITAMIN ORAL) Take 1 tablet by mouth once daily.      omega-3 fatty acids/fish oil (FISH OIL-OMEGA-3 FATTY ACIDS) 300-1,000 mg capsule Take 1 capsule by mouth once daily.      omeprazole-sodium bicarbonate 20-1.1 mg-gram Cap Take 1 tablet by mouth once daily. 30 each 11    polyethylene glycol (GLYCOLAX) 17 gram/dose powder Take 17 g by mouth once daily.      rosuvastatin (CRESTOR) 5 MG tablet Take 1 tablet (5 mg total) by mouth every evening. 90 tablet 3    sildenafiL (VIAGRA) 50 MG tablet TAKE 1 TABLET BY MOUTH DAILY AS NEEDED FOR ERECTILE DYSFUNCTION (TAKE  "30 MINUTES TO 4 HOURS BEFORE INTERCOURSE) 2 tablet 11    sotaloL (BETAPACE) 80 MG tablet Take 0.5 tablets (40 mg total) by mouth 2 (two) times daily. 30 tablet 1    tamsulosin (FLOMAX) 0.4 mg Cap Take 1 capsule (0.4 mg total) by mouth nightly. TAKE 1 CAPSULE BY MOUTH ONCE EVERY DAY (PROSTATE) 90 capsule 3    VIT C/VIT E/LUTEIN/MIN/OMEGA-3 (OCUVITE ORAL) Take 1 capsule by mouth once daily.       No current facility-administered medications on file prior to visit.         Family history of psychiatric illness:     Daughter anxiety and depression    Family History   Problem Relation Name Age of Onset    Arthritis Mother      Hypertension Mother      Heart disease Mother      Cancer Father      Hypertension Sister      Hypertension Brother         Trauma history:    Verbal/Emotional abuse Denied  Physical abuse witnessed children being physically disciplined   Sexual abuse denied  Any major losses in your life or events that you would consider traumatic?  Wife  of cancer  2 brothers recently   Friend just  last Friday  Medical trauma (knee surgery) lost way of life      Social history (marriage, employment, etc.):   Born and raised in [place] and moved to [place] in the 2nd Grade  Raised by Both parents and has 2 sisters and 3 brothers  Highest level of education: High School and trade school   Childhood history is described as "Pretty normal."  Relationships / children: in a personal relationship/3 children and 3 grands all girls.  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 Dad-Marine  Legal/Criminal history: Denied    Substance use:  Alcohol: occasional  Drugs: " none  Tobacco: none  Caffeine: none     Any other addictions: Socially gambles   Sex, gambling, eating d/o  Are you in recovery from drug or alcohol use?   If so, how long and how do you maintain sobriety?  Are you utilizing MAT?  How often do you drink alcohol? (age 1st use, last use, how often, what are you drinking)  Do you use any illegal or illicit drugs - (Cocaine, Methamphetamines, Opiates, Heroin, Xanax, Synthetics, THC)? (Age first use, last use, route of administration)          If yes to gambling, Denied  During the past 12 mos have you become restless, irritable, or anxious when trying to stop/cut down on gambling?   During the past 12 months, have tried to keep your family or friends from knowing how much you gambled?  During the past 12 mos, did you have such financial trouble that you had to get help from family or friends?    Current medications and drug reactions (include OTC, herbal): see medication list     Strengths and liabilities: Strength: Patient accepts guidance/feedback, Strength: Patient is expressive/articulate., Strength: Patient is intelligent., Strength: Patient is motivated for change., Strength: Patient has positive support network., Strength: Patient has reasonable judgment., Liability: Patient has poor health., Liability: Patient lacks coping skills.    Strengths- What personal qualities do you have which we can build upon in treatment? Motivated to get well, want to get back to life    Needs- What would help you achieve your goals? Talking and wiling to try tools    Abilities- What skills do you possess?    Preference- How do you want your treatment? Virtual, in-person, any one on SOFY Combination      Current Evaluation:     Mental Status Exam:  General Appearance:  unremarkable, age appropriate, casually dressed, neatly groomed   Speech: normal tone, normal rate, normal pitch, normal volume      Level of Cooperation: cooperative      Thought Processes: normal and logical   Mood:  "anxious, sad      Thought Content: normal, no suicidality, no homicidality, delusions, or paranoia   Affect: congruent and appropriate   Orientation: Oriented x3   Memory: recent >  intact, remote >  intact   Attention Span & Concentration: spelled "WORLD" forwards and backwards   Fund of General Knowledge: 3 of 4 recent presidents   Abstract Reasoning: interpretation of similarities was concrete   Judgment & Insight: good     Language intact     Diagnostic Impression - Plan:       ICD-10-CM ICD-9-CM   1. Moderate episode of recurrent major depressive disorder  F33.1 296.32   2. TYSHAWN (generalized anxiety disorder)  F41.1 300.02       Plan:individual psychotherapy and medication management by physician   Pt to go to ED or call 911 if symptoms worsen or if he has thoughts of harming self and/or others. Pt verbalized understanding.  Goal #1: Pt to learn CBT principles to learn how to identify and reframe maladaptive beliefs affecting mood.  Goal #2: Pt to learn relaxation tools and techniques    Pt is to attend supportive psychotherapy sessions. Patient was provided with handouts. He was introduced to deep breathing, grounding and thought stopping was discussed.     Ct was provided MH resource packet which included local MH resources, the National Suicide Helpline number, coping skills/relaxation techniques, CBT information      "

## 2024-12-05 ENCOUNTER — TELEPHONE (OUTPATIENT)
Dept: NEUROSURGERY | Facility: CLINIC | Age: 74
End: 2024-12-05
Payer: MEDICARE

## 2024-12-06 ENCOUNTER — OFFICE VISIT (OUTPATIENT)
Dept: NEUROSURGERY | Facility: CLINIC | Age: 74
End: 2024-12-06
Payer: MEDICARE

## 2024-12-06 DIAGNOSIS — H53.8 BLURRY VISION: ICD-10-CM

## 2024-12-06 DIAGNOSIS — R90.89 ABNORMAL BRAIN MRI: ICD-10-CM

## 2024-12-06 PROCEDURE — 99203 OFFICE O/P NEW LOW 30 MIN: CPT | Mod: S$PBB,,,

## 2024-12-06 NOTE — PROGRESS NOTES
"Neurosurgery History & Physical    Patient ID: Franco Russo is a 74 y.o. male.    No chief complaint on file.      HPI:  Mr. Russo is a 75yo male who presents today with his family for abnormal CT head.      He reports that on 11/19/2024 he had bilateral blurred vision that lasted approximately 2 minutes then resolved on its own.  He had not eaten that morning and had just started Duloxetine.  Denies that he had a headache, dizziness, lightheadedness, weakness, numbness, tingling, seizures, speech difficulty, auditory disturbances, bladder/bowel dysfunction, or gait imbalance at the time blurred vision occurred.  He was seen by his PCP who instructed him to stop his Duloxetine and utilize Xanax for any acute anxiety episodes.  He has not had any episodes of blurred vision since stopping his Duloxetine.  His PCP ordered a CT head without contrast which showed "diffuse involutional changes most prominent centrally with resultant similar supratentorial ventriculomegaly" and he was referred to NSGY for further evaluation.      Today, he denies any neurologic symptoms.  States that he has been doing well and has no complaints at this time.  Daughter reports that he has been dealing with significant anxiety for which he follows with Psychiatry.  Follows with Dr. Richey (Ophhalmology) every 6 months, last visit was scheduled for 2 weeks ago but was cancelled due to his anxiety and has not been rescheduled.  States that he had bilateral cataracts removed several years ago and had "strokes in his eyes" 3-4 years ago for which he underwent ocular injections in one of his eyes (could not remember which eye).      Denies confusion, urinary incontinence, and gait abnormality.  Uses walker to assist with ambulation PRN.  Hx of right knee replacement, reports that his right knee feels weak at times.     Vital signs:  HR 53  /67 R arm  Pain 0/10  No new allergies or medications    Review of Systems   Constitutional:  " Negative for activity change and fever.   Eyes:  Negative for visual disturbance.   Respiratory:  Negative for shortness of breath.    Cardiovascular:  Negative for chest pain.   Gastrointestinal:  Negative for nausea and vomiting.   Endocrine: Negative for cold intolerance, heat intolerance, polydipsia, polyphagia and polyuria.   Genitourinary:  Negative for decreased urine volume, difficulty urinating and frequency.   Musculoskeletal:  Negative for back pain, gait problem and neck pain.   Neurological:  Negative for dizziness, tremors, seizures, syncope, facial asymmetry, speech difficulty, weakness, light-headedness, numbness and headaches.   Psychiatric/Behavioral:  Negative for confusion. The patient is not nervous/anxious.        Past Medical History:   Diagnosis Date    Abnormal liver enzymes     Adenomatous colon polyp     Atrial fibrillation     Benign enlargement of prostate     Branch retinal vein occlusion     bilateral     Cataracts, bilateral     Decreased libido     Heel pain     History of chicken pox     Hypertriglyceridemia     Knee pain     VIDA (obstructive sleep apnea)     USES CPAP    Pinched nerve     pt has had pain in neck into shoulder, has been seen at [facility]     Polyuria     Psoriasis     Thyroid nodule      Social History     Socioeconomic History    Marital status: Single   Tobacco Use    Smoking status: Never    Smokeless tobacco: Never   Substance and Sexual Activity    Alcohol use: Not Currently     Alcohol/week: 2.0 standard drinks of alcohol     Types: 2 Shots of liquor per week    Drug use: Never     Social Drivers of Health     Financial Resource Strain: Low Risk  (10/5/2024)    Overall Financial Resource Strain (CARDIA)     Difficulty of Paying Living Expenses: Not very hard   Food Insecurity: No Food Insecurity (10/5/2024)    Hunger Vital Sign     Worried About Running Out of Food in the Last Year: Never true     Ran Out of Food in the Last Year: Never true    Physical Activity: Sufficiently Active (10/5/2024)    Exercise Vital Sign     Days of Exercise per Week: 3 days     Minutes of Exercise per Session: 60 min   Stress: Stress Concern Present (10/5/2024)    [employer] [place] [employer] - Occupational Stress Questionnaire     Feeling of Stress : Rather much   Housing Stability: Unknown (10/5/2024)    Housing Stability Vital Sign     Unable to Pay for Housing in the Last Year: No     Family History   Problem Relation Name Age of Onset    Arthritis Mother      Hypertension Mother      Heart disease Mother      Cancer Father      Hypertension Sister      Hypertension Brother       Review of patient's allergies indicates:   Allergen Reactions    Prozac [fluoxetine] Other (See Comments)     More anxious     Zoloft [sertraline] Anxiety       Current Outpatient Medications:     ALPRAZolam (XANAX) 0.25 MG tablet, Take 1 tablet (0.25 mg total) by mouth daily as needed for Anxiety. (Patient not taking: Reported on 11/19/2024), Disp: 15 tablet, Rfl: 0    DULoxetine (CYMBALTA) 20 MG capsule, Take 1 capsule (20 mg total) by mouth once daily for 14 days, THEN 2 capsules (40 mg total) once daily for 16 days., Disp: 46 capsule, Rfl: 0    ELIQUIS 5 mg Tab, TAKE 1 TABLET BY MOUTH TWICE DAILY, Disp: 60 tablet, Rfl: 6    finasteride (PROSCAR) 5 mg tablet, TAKE 1 TABLET BY MOUTH ONCE EVERY DAY FOR PROSTATE, Disp: 90 tablet, Rfl: 1    hydroCHLOROthiazide (HYDRODIURIL) 12.5 MG Tab, TAKE 1 TABLET BY MOUTH EVERY MORNING FOR FLUID RETENTION, Disp: 90 tablet, Rfl: 1    hydrOXYzine HCL (ATARAX) 25 MG tablet, Take 1 tablet (25 mg total) by mouth 3 (three) times daily as needed for Anxiety., Disp: 90 tablet, Rfl: 0    levocetirizine (XYZAL) 5 MG tablet, Take 5 mg by mouth every evening., Disp: , Rfl:     losartan (COZAAR) 100 MG tablet, TAKE 1 TABLET BY MOUTH ONCE EVERY DAY FOR BLOOD PRESSURE, Disp: 90 tablet, Rfl: 3    mirtazapine (REMERON) 7.5 MG Tab, Take 1 tablet (7.5 mg total) by  mouth every evening. for 7 days, Disp: 7 tablet, Rfl: 0    MULTIVIT &MINERALS/FERROUS FUM (MULTI VITAMIN ORAL), Take 1 tablet by mouth once daily., Disp: , Rfl:     omega-3 fatty acids/fish oil (FISH OIL-OMEGA-3 FATTY ACIDS) 300-1,000 mg capsule, Take 1 capsule by mouth once daily., Disp: , Rfl:     omeprazole-sodium bicarbonate 20-1.1 mg-gram Cap, Take 1 tablet by mouth once daily., Disp: 30 each, Rfl: 11    polyethylene glycol (GLYCOLAX) 17 gram/dose powder, Take 17 g by mouth once daily., Disp: , Rfl:     rosuvastatin (CRESTOR) 5 MG tablet, Take 1 tablet (5 mg total) by mouth every evening., Disp: 90 tablet, Rfl: 3    sildenafiL (VIAGRA) 50 MG tablet, TAKE 1 TABLET BY MOUTH DAILY AS NEEDED FOR ERECTILE DYSFUNCTION (TAKE 30 MINUTES TO 4 HOURS BEFORE INTERCOURSE), Disp: 2 tablet, Rfl: 11    sotaloL (BETAPACE) 80 MG tablet, Take 0.5 tablets (40 mg total) by mouth 2 (two) times daily., Disp: 30 tablet, Rfl: 1    tamsulosin (FLOMAX) 0.4 mg Cap, Take 1 capsule (0.4 mg total) by mouth nightly. TAKE 1 CAPSULE BY MOUTH ONCE EVERY DAY (PROSTATE), Disp: 90 capsule, Rfl: 3    VIT C/VIT E/LUTEIN/MIN/OMEGA-3 (OCUVITE ORAL), Take 1 capsule by mouth once daily., Disp: , Rfl:   There were no vitals taken for this visit.      Neurological Exam  Mental Status  Awake, alert and oriented to person, place and time. Speech is normal. Language is fluent with no aphasia.    Cranial Nerves  CN II: Visual acuity is normal. Visual fields full to confrontation.  CN III, IV, VI: Extraocular movements intact bilaterally. Normal lids and orbits bilaterally. Pupils equal round and reactive to light bilaterally.  CN V: Facial sensation is normal.  CN VII: Full and symmetric facial movement.  CN VIII: Hearing is normal.  CN IX, X: Palate elevates symmetrically. Normal gag reflex.  CN XI: Shoulder shrug strength is normal.  CN XII: Tongue midline without atrophy or fasciculations.    Motor   Strength is 5/5 throughout all four  extremities.    Sensory  Light touch is normal in upper and lower extremities.     Reflexes                                            Right                      Left  Biceps                                 2+                         2+  Patellar                                0                         2+    Gait  Casual gait: Antalgic gait.  Using a walker to assist with ambulation .      Physical Exam  Eyes:      General: Lids are normal.      Extraocular Movements: Extraocular movements intact.      Pupils: Pupils are equal, round, and reactive to light.   Neurological:      Motor: Motor strength is normal.     Deep Tendon Reflexes:      Reflex Scores:       Bicep reflexes are 2+ on the right side and 2+ on the left side.       Patellar reflexes are 0 on the right side and 2+ on the left side.  Psychiatric:         Speech: Speech normal.         Imaging:  CT head without contrast dated 11/19/2024 personally reviewed and discussed with patient.    Findings:  There are diffuse cerebral and cerebellar parenchymal involutional changes with resultant similar prominence of the ventricles and basal cisterns. There is similar scattered asymmetric low density without obvious mass-effect throughout the bilateral supratentorial white matter. The gray-white junctions are maintained. No other cerebral or cerebellar parenchymal abnormality is identified. There is no hemorrhage, midline shift, significant mass-effect, or extra-axial fluid collection. There are calcifications of the distal internal carotid and vertebrobasilar arteries.  There is minimal scattered chronic sinus disease.  The mastoid air cells are clear. The calvarium is intact.     Impression:  1. No acute intracranial process is identified.  2. Similar scattered white matter microvascular ischemic changes.  3. Diffuse involutional changes most prominent centrally with resultant similar supratentorial ventriculomegaly.     Electronically signed by:Stan Smith  "MD  Date:                                            11/19/2024  Time:                                           16:44    CT head without contrast dated 10/15/2024 personally reviewed and discussed with patient.  FINDINGS:  INTRACRANIAL: Stable global volume loss.  Gray-white differentiation is preserved.  No acute intracranial hemorrhage.  No hydrocephalus.  No intracranial mass effect.     SINUSES: Visualized paranasal sinuses and mastoids are clear.     SKULL/SCALP: Visualized osseous structures are normal.     ORBITS: Visualized orbits are normal.     Impression:     [place] concordant.  No acute intracranial abnormality.     Electronically signed by:Haroon Jimenez MD  Date:                                            10/15/2024  Time:                                           06:28    CT head without contrast dated 08/31/2024 personally reviewed and discussed with patient.  FINDINGS:  INTRACRANIAL: Mild-moderate global volume loss with slight central predominance.  Gray-white differentiation is preserved.  No acute intracranial hemorrhage.  No hydrocephalus.  No intracranial mass effect.     SINUSES: Visualized paranasal sinuses and mastoids are clear.     SKULL/SCALP: Visualized osseous structures are normal.     ORBITS: Bilateral lens replacements.     Impression:     No acute intracranial abnormality.     Electronically signed by:Haroon Jimenez MD  Date:                                            08/31/2024  Time:                                           17:09    Assessment/Plan:    Mr. Russo is a 73yo male with an episode of transient blurred vision after starting Duloxetine.  I suspect the blurred vision may have been due to his Duloxetine as blurred vision is considered a common reaction and he has had no episodes of visual disturbance since stopping the medication.  He denies any neurologic deficits and appears neurologically intact on exam.  We discussed that "diffuse involutional changes" in the " "brain occur as a normal part of aging.  These changes appear as a loss of brain volume, widening of sulci, and enlargement of ventricles.  Since he is neurologically intact and his family reports that he has not suffered from any recent cognitive decline or s/s of hydrocephalus, I do not think that these changes are concerning at this time.  I discussed that if he were to develop any s/s of hydrocephalus then a  shunt could be considered.  Mr. Russo will follow-up with our office as needed in the future.    He currently follows with Neurology for "right ulnar nerve entrapment".  I discussed that if he were to develop any s/s of cognitive decline that he can undergo neurocognitive testing with Neurology for further evaluation.  I also discussed that he should also reschedule his appointment with his Ophthalmologist.    "

## 2025-01-02 PROBLEM — I48.0 PAF (PAROXYSMAL ATRIAL FIBRILLATION): Status: RESOLVED | Noted: 2020-02-24 | Resolved: 2025-01-02

## 2025-01-02 PROBLEM — F30.10 MANIC BEHAVIOR: Status: ACTIVE | Noted: 2025-01-02

## 2025-01-03 ENCOUNTER — TELEPHONE (OUTPATIENT)
Dept: NEUROLOGY | Facility: CLINIC | Age: 75
End: 2025-01-03
Payer: MEDICARE

## 2025-01-03 NOTE — TELEPHONE ENCOUNTER
Called patient to discuss referral for cognitive and behavioral changes. No answer. Left message to return call.

## 2025-01-06 ENCOUNTER — TELEPHONE (OUTPATIENT)
Dept: NEUROLOGY | Facility: CLINIC | Age: 75
End: 2025-01-06
Payer: MEDICARE

## 2025-01-06 NOTE — TELEPHONE ENCOUNTER
----- Message from Herberth sent at 1/6/2025  9:59 AM CST -----  Regarding: Returning call  Contact: daughter  Type:  Patient Returning Call    Who Called:daughter Minal    Who Left Message for Patient:Zee    Does the patient know what this is regarding?:     Would the patient rather a call back or a response via [facility]? Call back    Best Call Back Number: [phone]    Additional Information:   Please advise -- Thank you

## 2025-01-06 NOTE — TELEPHONE ENCOUNTER
----- Message from Yoel sent at 1/6/2025  8:12 AM CST -----  Type: Needs Medical Advice    Who Called:  Pt's daughterrafy Call Back Number: [phone]  Additional Information: Pt's daughter returning call. Please call back to advise, Thanks!

## 2025-01-06 NOTE — TELEPHONE ENCOUNTER
Daughter, Frances, states her sister do not think patient is having memory issues. Frances states patient does not want to eat, go anywhere or do anything. He is seeing psychiatry and a counselor but will not talk. She states they noticed this change after his knee surgery. Suggested speaking with psychiatry at next appointment and asking if they think everything is due to psychiatric condition or if patient needs a memory work up. Frances voiced understanding.

## 2025-01-06 NOTE — TELEPHONE ENCOUNTER
Spoke with patients daughter, Frances, to see if patient is having actual memory problems or just mood and behavior. She states he does have a lot of trouble remembering and the memory issues started before he was admitted to a behavioral facility. She states he has seen psychiatry and diagnosed a bipolar but was taken off of one of his medications. Explained that neurology would not treat the mood or behavioral concerns, his appointment would be for a dementia work up. Frances states she will talk with her sisters and call back.

## 2025-01-28 ENCOUNTER — TELEPHONE (OUTPATIENT)
Dept: NEUROSURGERY | Facility: CLINIC | Age: 75
End: 2025-01-28
Payer: MEDICARE

## 2025-01-28 NOTE — TELEPHONE ENCOUNTER
-cotacted daughter regarding message  -pt daughter stated pt is having auditory sensitivity, lapses in memory  -pt daughter would like pt to see Dr.   -informed her I will reach out to laura and see what type of imaging he needs  -informed her to reach out to his Neurologist in the mean time  -Pt daughter verbalized understanding    ----- Message from Deny sent at 1/27/2025  3:17 PM CST -----  Regarding: appt  Contact: DL BILLY [[id number]]  Type:  Sooner Appointment Request    Caller is requesting a sooner appointment.      Name of Caller:  Frances    When is the first available appointment?  Dept book    Symptoms:  Sensitive to noise and forgets to eat    Would the patient rather a call back or a response via [facility]? Call    Best Call Back Number:  [phone]    Additional Information:  Please call to advise.

## 2025-01-31 ENCOUNTER — TELEPHONE (OUTPATIENT)
Dept: NEUROSURGERY | Facility: CLINIC | Age: 75
End: 2025-01-31
Payer: MEDICARE

## 2025-01-31 DIAGNOSIS — R41.3 OTHER AMNESIA: Primary | ICD-10-CM

## 2025-01-31 NOTE — TELEPHONE ENCOUNTER
----- Message from Grace sent at 1/31/2025  8:26 AM CST -----  Type:  Needs Medical Advice    Who Called: Frances- Patients Daughter    Would the patient rather a call back or a response via [facility]? Please call    Best Call Back Number: [phone]    Additional Information: Cleemntine please call Patients daughter regarding her Fathers prior testing to appt   Please call back to advise. Thanks!

## 2025-02-13 ENCOUNTER — TELEPHONE (OUTPATIENT)
Dept: NEUROSURGERY | Facility: CLINIC | Age: 75
End: 2025-02-13
Payer: MEDICARE

## 2025-02-14 ENCOUNTER — HOSPITAL ENCOUNTER (OUTPATIENT)
Dept: RADIOLOGY | Facility: HOSPITAL | Age: 75
Discharge: HOME | End: 2025-02-14
Payer: MEDICARE

## 2025-02-14 DIAGNOSIS — R41.3 OTHER AMNESIA: ICD-10-CM

## 2025-02-14 PROCEDURE — 70551 MRI BRAIN STEM W/O DYE: CPT | Mod: 26,,, | Performed by: RADIOLOGY

## 2025-02-14 PROCEDURE — 70551 MRI BRAIN STEM W/O DYE: CPT | Mod: TC,PO

## 2025-02-19 ENCOUNTER — OFFICE VISIT (OUTPATIENT)
Dept: NEUROSURGERY | Facility: CLINIC | Age: 75
End: 2025-02-19
Payer: MEDICARE

## 2025-02-19 VITALS
BODY MASS INDEX: 24.68 KG/M2 | DIASTOLIC BLOOD PRESSURE: 78 MMHG | HEIGHT: 70 IN | RESPIRATION RATE: 18 BRPM | HEART RATE: 47 BPM | SYSTOLIC BLOOD PRESSURE: 121 MMHG | WEIGHT: 172.38 LBS

## 2025-02-19 DIAGNOSIS — H93.299: Primary | ICD-10-CM

## 2025-02-19 PROCEDURE — 99215 OFFICE O/P EST HI 40 MIN: CPT | Mod: S$PBB,,, | Performed by: NEUROLOGICAL SURGERY

## 2025-02-19 NOTE — PROGRESS NOTES
"Neurosurgery History & Physical    Patient ID: Franco Russo is a 74 y.o. male.    Chief Complaint   Patient presents with    Follow-up     Image review       Interval HPI 02/19/2025:  Mr. Russo returns today with his family for follow-up.    Family reports that patient was not honest at last visit.  States that he is having difficulties with auditory sensitivity.  Reports that they cannot talk loud and the dog cannot bark since it worsens his auditory issues.  Also, report that he suffers from anxiety and depression.  He states that he either stays in bed or walks around his house.  Is not currently driving due to right knee replacement.      Reports that he can some gait issues due to right knee replacement.  States that he suffers from constipation after surgery.  Daughter reports that he can have occasional episodes of bladder and bowel incontinence.  Reports that he continues to handle his personal issues such as calendar, finances, etc.     HPI 12/06/2024:  Mr. Russo is a 75yo male who presents today with his family for abnormal CT head.       He reports that on 11/19/2024 he had bilateral blurred vision that lasted approximately 2 minutes then resolved on its own.  He had not eaten that morning and had just started Duloxetine.  Denies that he had a headache, dizziness, lightheadedness, weakness, numbness, tingling, seizures, speech difficulty, auditory disturbances, bladder/bowel dysfunction, or gait imbalance at the time blurred vision occurred.  He was seen by his PCP who instructed him to stop his Duloxetine and utilize Xanax for any acute anxiety episodes.  He has not had any episodes of blurred vision since stopping his Duloxetine.  His PCP ordered a CT head without contrast which showed "diffuse involutional changes most prominent centrally with resultant similar supratentorial ventriculomegaly" and he was referred to NSGY for further evaluation.       Today, he denies any neurologic symptoms.  States " "that he has been doing well and has no complaints at this time.  Daughter reports that he has been dealing with significant anxiety for which he follows with Psychiatry.  Follows with Dr. Richey (Ophhalmology) every 6 months, last visit was scheduled for 2 weeks ago but was cancelled due to his anxiety and has not been rescheduled.  States that he had bilateral cataracts removed several years ago and had "strokes in his eyes" 3-4 years ago for which he underwent ocular injections in one of his eyes (could not remember which eye).       Denies confusion, urinary incontinence, and gait abnormality.  Uses walker to assist with ambulation PRN.  Hx of right knee replacement, reports that his right knee feels weak at times.     Review of Systems   Constitutional:  Negative for activity change and fever.   Eyes:  Negative for visual disturbance.   Respiratory:  Negative for shortness of breath.    Cardiovascular:  Negative for chest pain.   Gastrointestinal:  Negative for nausea and vomiting.   Endocrine: Negative for cold intolerance, heat intolerance, polydipsia, polyphagia and polyuria.   Genitourinary:  Negative for decreased urine volume, difficulty urinating and frequency.   Musculoskeletal:  Negative for back pain, gait problem and neck pain.   Neurological:  Negative for dizziness, tremors, seizures, syncope, facial asymmetry, speech difficulty, weakness, light-headedness, numbness and headaches.   Psychiatric/Behavioral:  Negative for confusion.        Past Medical History:   Diagnosis Date    Abnormal liver enzymes     Adenomatous colon polyp     Atrial fibrillation     Benign enlargement of prostate     Branch retinal vein occlusion     bilateral     Cataracts, bilateral     Decreased libido     Heel pain     History of chicken pox     Hypertriglyceridemia     Knee pain     VIDA (obstructive sleep apnea)     USES CPAP    Pinched nerve     pt has had pain in neck into shoulder, has [facility]     Polyuria     Psoriasis     Thyroid nodule      Social History[1]  Family History   Problem Relation Name Age of Onset    Arthritis Mother      Hypertension Mother      Heart disease Mother      Cancer Father      Hypertension Sister      Hypertension Brother       Review of patient's allergies indicates:   Allergen Reactions    Prozac [fluoxetine] Other (See Comments)     More anxious     Remeron [mirtazapine] Other (See Comments)     Anxious and worry     Zoloft [sertraline] Anxiety     Current Medications[2]  Resp. rate 18, height 5' 10" (1.778 m), weight 78.2 kg (172 lb 6.4 oz).      Neurological Exam  Mental Status  Awake, alert and oriented to person, place and time. Speech is normal. Language is fluent with no aphasia.    Cranial Nerves  CN II: Visual acuity is normal. Visual fields full to confrontation.  CN III, IV, VI: Extraocular movements intact bilaterally. Normal lids and orbits bilaterally. Pupils equal round and reactive to light bilaterally.  CN V: Facial sensation is normal.  CN VII: Full and symmetric facial movement.  CN VIII: Hearing is normal.  CN IX, X: Palate elevates symmetrically. Normal gag reflex.  CN XI: Shoulder shrug strength is normal.  CN XII: Tongue midline without atrophy or fasciculations.    Motor   Strength is 5/5 throughout all four extremities.    Sensory  Light touch is normal in upper and lower extremities.     Gait    Uses a walker to assist with ambulation.      Physical Exam  Eyes:      General: Lids are normal.      Extraocular Movements: Extraocular movements intact.      Pupils: Pupils are equal, round, and reactive to light.   Neurological:      Motor: Motor strength is normal.  Psychiatric:         Speech: Speech normal.         Imaging:  MRI brain without contrast dated 02/14/2025 personally reviewed and discussed with patient.    FINDINGS:  Prominence of the ventricles and sulci compatible with mild-to-moderate generalized cerebral volume loss.  No " hydrocephalus. No extra-axial blood or fluid collections.     Scattered foci of T2/FLAIR hyperintense signal within the periventricular and subcortical white matter, nonspecific and may reflect sequelae of mild chronic small vessel ischemic change.    Diffusion-weighted images demonstrate no evidence of an acute infarct.   Susceptibility weighted images demonstrate no evidence of acute or chronic hemorrhage. No significant intracranial mass effect or midline shift.     Normal vascular flow voids are present.     Bone marrow signal intensity is normal. The paranasal sinuses are normal.  The visualized portions of the mastoids are unremarkable.     Bilateral lens replacements are noted.     Impression:     1. No acute intracranial abnormality.  2. Mild-to-moderate generalized cerebral volume loss with mild scattered T2/FLAIR hyperintense signal within the supratentorial white matter, nonspecific but probably reflecting sequelae of chronic small vessel ischemic change.     Electronically signed by:Pilo Garcia  Date:                                            02/14/2025  Time:                                           09:45    Assessment/Plan:    Mr. Russo is a 73yo male with auditory sensitivity.  From a NSGY perspective, we discussed that his auditory sensitivity is not explained by his MRI brain.  I discussed that ENT may be of benefit to further explore his auditory issues.  His daughter reports that she sees Dr. Sarwat Machuca and will make an appointment for her father.  We also discussed that Neuropsychology can administer cognitive testing.  He is agreeable to the plan and will follow-up with our office as needed in the future.      I spent a total of 40 minutes on the day of the visit.This includes face to face time and non-face to face time preparing to see the patient (eg, review of tests), obtaining and/or reviewing separately obtained history, documenting clinical information in the electronic or other  health record, independently interpreting results and communicating results to the patient/family/caregiver, or care coordinator.         [1]   Social History  Socioeconomic History    Marital status: Single   Tobacco Use    Smoking status: Never    Smokeless tobacco: Never   Substance and Sexual Activity    Alcohol use: Not Currently     Alcohol/week: 2.0 standard drinks of alcohol     Types: 2 Shots of liquor per week    Drug use: Never     Social Drivers of Health     Financial Resource Strain: Low Risk  (12/9/2024)    Received from [facility]    Overall Financial Resource Strain (CARDIA)     Difficulty of Paying Living Expenses: Not very hard   Food Insecurity: No Food Insecurity (12/9/2024)    Received from [facility]    Hunger Vital Sign     Worried About Running Out of Food in the Last Year: Never true     Ran Out of Food in the Last Year: Never true   Transportation Needs: No Transportation Needs (12/9/2024)    Received from [facility]    PRAPARE - Transportation     Lack of Transportation (Medical): No     Lack of Transportation (Non-Medical): No   Physical Activity: Inactive (12/9/2024)    Received from [facility]    Exercise Vital Sign     Days of Exercise per Week: 0 days     Minutes of Exercise per Session: 0 min   Stress: Stress Concern Present (12/9/2024)    Received from [facility]    [employer] - Occupational Stress Questionnaire     Feeling of Stress : To some extent   Housing Stability: Low Risk  (12/9/2024)    Received from [facility]    Housing Stability Vital Sign     Unable to Pay for Housing in the Last Year: No     Number of Times Moved in the Last Year: 0     Homeless in the Last Year: No   [2]   Current Outpatient Medications:     ALPRAZolam (XANAX) 0.25 MG tablet, Take 1 tablet (0.25 mg total) by mouth daily as needed for Anxiety. (Patient not taking: Reported on 1/2/2025), Disp: 15 tablet, Rfl: 0    atorvastatin (LIPITOR) 10 MG tablet, TAKE 1 TABLET BY  MOUTH NIGHTLY FOR 14 DAYS, Disp: 14 tablet, Rfl: 0    ELIQUIS 5 mg Tab, TAKE 1 TABLET BY MOUTH TWICE DAILY, Disp: 60 tablet, Rfl: 6    finasteride (PROSCAR) 5 mg tablet, TAKE 1 TABLET BY MOUTH ONCE EVERY DAY FOR PROSTATE, Disp: 90 tablet, Rfl: 1    FLUoxetine 40 MG capsule, Take 40 mg by mouth., Disp: , Rfl:     gabapentin (NEURONTIN) 300 MG capsule, Take 300 mg by mouth 3 (three) times daily., Disp: , Rfl:     hydroCHLOROthiazide (HYDRODIURIL) 12.5 MG Tab, TAKE 1 TABLET BY MOUTH EVERY MORNING FOR FLUID RETENTION, Disp: 90 tablet, Rfl: 1    levocetirizine (XYZAL) 5 MG tablet, Take 5 mg by mouth every evening., Disp: , Rfl:     losartan (COZAAR) 100 MG tablet, TAKE 1 TABLET BY MOUTH ONCE EVERY DAY FOR BLOOD PRESSURE, Disp: 90 tablet, Rfl: 3    mirtazapine (REMERON) 7.5 MG Tab, Take 1 tablet (7.5 mg total) by mouth every evening. for 7 days, Disp: 7 tablet, Rfl: 0    MULTIVIT &MINERALS/FERROUS FUM (MULTI VITAMIN ORAL), Take 1 tablet by mouth once daily. (Patient not taking: Reported on 1/2/2025), Disp: , Rfl:     omega-3 fatty acids/fish oil (FISH OIL-OMEGA-3 FATTY ACIDS) 300-1,000 mg capsule, Take 1 capsule by mouth once daily. (Patient not taking: Reported on 1/2/2025), Disp: , Rfl:     omeprazole-sodium bicarbonate 20-1.1 mg-gram Cap, Take 1 tablet by mouth once daily. (Patient not taking: Reported on 1/2/2025), Disp: 30 each, Rfl: 11    polyethylene glycol (GLYCOLAX) 17 gram/dose powder, Take 17 g by mouth once daily., Disp: , Rfl:     sildenafiL (VIAGRA) 50 MG tablet, TAKE 1 TABLET BY MOUTH DAILY AS NEEDED FOR ERECTILE DYSFUNCTION (TAKE 30 MINUTES TO 4 HOURS BEFORE INTERCOURSE) (Patient not taking: Reported on 1/2/2025), Disp: 2 tablet, Rfl: 11    sotaloL (BETAPACE) 80 MG tablet, Take 0.5 tablets (40 mg total) by mouth 2 (two) times daily., Disp: 30 tablet, Rfl: 1    tamsulosin (FLOMAX) 0.4 mg Cap, Take 1 capsule (0.4 mg total) by mouth nightly. TAKE 1 CAPSULE BY MOUTH ONCE EVERY DAY (PROSTATE), Disp: 90 capsule,  Rfl: 3    VIT C/VIT E/LUTEIN/MIN/OMEGA-3 (OCUVITE ORAL), Take 1 capsule by mouth once daily. (Patient not taking: Reported on 1/2/2025), Disp: , Rfl:

## 2025-03-18 ENCOUNTER — OUTPATIENT CASE MANAGEMENT (OUTPATIENT)
Dept: ADMINISTRATIVE | Facility: OTHER | Age: 75
End: 2025-03-18
Payer: MEDICARE

## 2025-03-18 NOTE — LETTER
Franco Russo  [address] [place] [id number]      Dear Franco Russo,     I work with [facility] Outpatient Care Management Department. We received a referral to call you to discuss your medical history. These services are free of charge and are offered to [facility] patients who have recently been discharged from any of our facilities or who have medical conditions that may require the skill of a nurse to assist with management.     I am a Registered Nurse who specializes in connecting patients with available medical and financial resources as well as addressing any educational needs that may be indicated.    I attempted to reach you by telephone, but I was unsuccessful. Please call our department so that we can go over some questions with you, regarding your health.    The Outpatient Care Management Department can be reached at [phone], from 8:00AM to 4:30 PM, on Monday thru Friday.     Additionally, [facility] also has a program where a nurse is available 24/7 to answer questions or provide medical advice, their number is [phone].      Thanks,        Liz Velasquez RN  Outpatient Care Management  Phone #: [phone]

## 2025-03-18 NOTE — PROGRESS NOTES
3/18/2025  1st attempt to complete Initial Assessment  for Outpatient Care Management, left message.  Will send via portal -  unable to assess letter.

## 2025-03-20 ENCOUNTER — OUTPATIENT CASE MANAGEMENT (OUTPATIENT)
Dept: ADMINISTRATIVE | Facility: OTHER | Age: 75
End: 2025-03-20
Payer: MEDICARE

## 2025-03-20 NOTE — PROGRESS NOTES
3/20/2025  2nd attempt to complete Initial Assessment  for Outpatient Care Management, left message.

## 2025-03-20 NOTE — PROGRESS NOTES
3/20/2025 - Was informed by daughter that Mr. Russo is currently in [facility] at this time; likely discharge in 1 week.  [facility] RN will follow up then.

## 2025-04-30 ENCOUNTER — OUTPATIENT CASE MANAGEMENT (OUTPATIENT)
Dept: ADMINISTRATIVE | Facility: OTHER | Age: 75
End: 2025-04-30
Payer: MEDICARE

## 2025-05-07 ENCOUNTER — OUTPATIENT CASE MANAGEMENT (OUTPATIENT)
Dept: ADMINISTRATIVE | Facility: OTHER | Age: 75
End: 2025-05-07
Payer: MEDICARE

## 2025-05-07 NOTE — PROGRESS NOTES
5/7/2025  2nd attempt to complete Initial Assessment  for Outpatient Care Management, left message.

## 2025-05-13 ENCOUNTER — OUTPATIENT CASE MANAGEMENT (OUTPATIENT)
Dept: ADMINISTRATIVE | Facility: OTHER | Age: 75
End: 2025-05-13
Payer: MEDICARE

## 2025-05-15 NOTE — PROGRESS NOTES
.   Outpatient Care Management  Patient Does Not Consent    Patient: Franco Russo  MRN:  [id number]  Date of Service:  5/15/2025  Completed by:  Liz Velasquez RN    Chief Complaint   Patient presents with    Case Closure    [facility] Enrollment Call     5/13/2025  3rd attempt to complete Initial Assessment  for Outpatient Care Management, left message.         Patient Summary